# Patient Record
Sex: FEMALE | Race: WHITE | NOT HISPANIC OR LATINO
[De-identification: names, ages, dates, MRNs, and addresses within clinical notes are randomized per-mention and may not be internally consistent; named-entity substitution may affect disease eponyms.]

---

## 2018-02-27 PROBLEM — Z00.00 ENCOUNTER FOR PREVENTIVE HEALTH EXAMINATION: Status: ACTIVE | Noted: 2018-02-27

## 2018-03-06 ENCOUNTER — APPOINTMENT (OUTPATIENT)
Dept: OBGYN | Facility: CLINIC | Age: 23
End: 2018-03-06
Payer: COMMERCIAL

## 2018-03-06 PROCEDURE — 99214 OFFICE O/P EST MOD 30 MIN: CPT

## 2018-03-20 ENCOUNTER — APPOINTMENT (OUTPATIENT)
Dept: OBGYN | Facility: CLINIC | Age: 23
End: 2018-03-20
Payer: COMMERCIAL

## 2018-03-20 ENCOUNTER — TRANSCRIPTION ENCOUNTER (OUTPATIENT)
Age: 23
End: 2018-03-20

## 2018-03-20 PROCEDURE — 58300 INSERT INTRAUTERINE DEVICE: CPT

## 2018-04-17 ENCOUNTER — APPOINTMENT (OUTPATIENT)
Dept: OBGYN | Facility: CLINIC | Age: 23
End: 2018-04-17
Payer: COMMERCIAL

## 2018-04-17 PROCEDURE — 99213 OFFICE O/P EST LOW 20 MIN: CPT

## 2018-08-27 ENCOUNTER — APPOINTMENT (OUTPATIENT)
Dept: OBGYN | Facility: CLINIC | Age: 23
End: 2018-08-27
Payer: COMMERCIAL

## 2018-08-27 PROCEDURE — 99213 OFFICE O/P EST LOW 20 MIN: CPT

## 2018-09-26 ENCOUNTER — APPOINTMENT (OUTPATIENT)
Dept: OBGYN | Facility: CLINIC | Age: 23
End: 2018-09-26
Payer: COMMERCIAL

## 2018-09-26 PROCEDURE — 76830 TRANSVAGINAL US NON-OB: CPT

## 2019-03-12 ENCOUNTER — APPOINTMENT (OUTPATIENT)
Dept: OBGYN | Facility: CLINIC | Age: 24
End: 2019-03-12
Payer: COMMERCIAL

## 2019-03-12 PROCEDURE — 99212 OFFICE O/P EST SF 10 MIN: CPT

## 2019-03-19 ENCOUNTER — APPOINTMENT (OUTPATIENT)
Dept: OBGYN | Facility: CLINIC | Age: 24
End: 2019-03-19
Payer: COMMERCIAL

## 2019-03-19 PROCEDURE — 76818 FETAL BIOPHYS PROFILE W/NST: CPT

## 2019-03-19 PROCEDURE — 93975 VASCULAR STUDY: CPT

## 2019-03-26 ENCOUNTER — APPOINTMENT (OUTPATIENT)
Dept: OBGYN | Facility: CLINIC | Age: 24
End: 2019-03-26
Payer: COMMERCIAL

## 2019-03-26 PROCEDURE — 58301 REMOVE INTRAUTERINE DEVICE: CPT

## 2019-04-30 ENCOUNTER — APPOINTMENT (OUTPATIENT)
Dept: OBGYN | Facility: CLINIC | Age: 24
End: 2019-04-30

## 2019-05-28 ENCOUNTER — APPOINTMENT (OUTPATIENT)
Dept: OBGYN | Facility: CLINIC | Age: 24
End: 2019-05-28
Payer: COMMERCIAL

## 2019-05-28 PROCEDURE — 76830 TRANSVAGINAL US NON-OB: CPT

## 2020-05-14 ENCOUNTER — APPOINTMENT (OUTPATIENT)
Dept: OBGYN | Facility: CLINIC | Age: 25
End: 2020-05-14
Payer: COMMERCIAL

## 2020-05-14 PROCEDURE — 99395 PREV VISIT EST AGE 18-39: CPT

## 2020-05-14 PROCEDURE — 87490 CHLMYD TRACH DNA DIR PROBE: CPT

## 2020-06-08 ENCOUNTER — APPOINTMENT (OUTPATIENT)
Dept: OBGYN | Facility: CLINIC | Age: 25
End: 2020-06-08
Payer: COMMERCIAL

## 2020-06-08 PROCEDURE — 99212 OFFICE O/P EST SF 10 MIN: CPT

## 2020-06-30 ENCOUNTER — APPOINTMENT (OUTPATIENT)
Dept: OBGYN | Facility: CLINIC | Age: 25
End: 2020-06-30
Payer: COMMERCIAL

## 2020-06-30 PROCEDURE — 57454 BX/CURETT OF CERVIX W/SCOPE: CPT

## 2020-07-14 ENCOUNTER — APPOINTMENT (OUTPATIENT)
Dept: OBGYN | Facility: CLINIC | Age: 25
End: 2020-07-14
Payer: COMMERCIAL

## 2020-07-14 PROCEDURE — 99212 OFFICE O/P EST SF 10 MIN: CPT

## 2020-11-17 ENCOUNTER — APPOINTMENT (OUTPATIENT)
Dept: OBGYN | Facility: CLINIC | Age: 25
End: 2020-11-17
Payer: COMMERCIAL

## 2020-11-17 PROCEDURE — 99213 OFFICE O/P EST LOW 20 MIN: CPT

## 2020-11-17 PROCEDURE — 87490 CHLMYD TRACH DNA DIR PROBE: CPT

## 2021-05-18 ENCOUNTER — NON-APPOINTMENT (OUTPATIENT)
Age: 26
End: 2021-05-18

## 2021-06-02 ENCOUNTER — NON-APPOINTMENT (OUTPATIENT)
Age: 26
End: 2021-06-02

## 2021-06-02 ENCOUNTER — APPOINTMENT (OUTPATIENT)
Dept: OTOLARYNGOLOGY | Facility: CLINIC | Age: 26
End: 2021-06-02
Payer: COMMERCIAL

## 2021-06-02 VITALS — BODY MASS INDEX: 39.24 KG/M2 | HEIGHT: 67 IN | WEIGHT: 250 LBS

## 2021-06-02 DIAGNOSIS — Z78.9 OTHER SPECIFIED HEALTH STATUS: ICD-10-CM

## 2021-06-02 DIAGNOSIS — J02.9 ACUTE PHARYNGITIS, UNSPECIFIED: ICD-10-CM

## 2021-06-02 PROCEDURE — 99204 OFFICE O/P NEW MOD 45 MIN: CPT | Mod: 25

## 2021-06-02 PROCEDURE — 31575 DIAGNOSTIC LARYNGOSCOPY: CPT

## 2021-06-02 NOTE — HISTORY OF PRESENT ILLNESS
[de-identified] : Patient presents today with c/o sore throat. Constant sore throat. Recently finished Doxy which helped a bit. Patient states the otalgia started October 2020.Denies dysphagia. intermittent horseness. \par She states the throat pain starts first. She feels like she constantly has an ear infection but was checked multiple times and no infection.\par admits reflux daily, however not on any Meds. +coffee +eating late.

## 2021-06-02 NOTE — PROCEDURE
[True Vocal Cords Erythematous] : true vocal cord edema on the right [Glottis Arytenoid Cartilages Erythema] : bilateral arytenoid ~M erythema [Normal] : posterior cricoid area had healthy pink mucosa in the interarytenoid area and the esophageal inlet [Interarytenoid Edema] : interarytenoid area edematous

## 2021-06-04 ENCOUNTER — RX RENEWAL (OUTPATIENT)
Age: 26
End: 2021-06-04

## 2021-07-01 ENCOUNTER — APPOINTMENT (OUTPATIENT)
Dept: OTOLARYNGOLOGY | Facility: CLINIC | Age: 26
End: 2021-07-01
Payer: COMMERCIAL

## 2021-07-01 PROCEDURE — 31575 DIAGNOSTIC LARYNGOSCOPY: CPT

## 2021-07-01 PROCEDURE — 99213 OFFICE O/P EST LOW 20 MIN: CPT | Mod: 25

## 2021-07-01 NOTE — ASSESSMENT
[FreeTextEntry1] : Pt was counselled on a low acid acid and appropriate dietary modifications.\par \par Pt will wean off allergy meds and continue reflux for another 6 weeks. \par

## 2021-07-01 NOTE — HISTORY OF PRESENT ILLNESS
[FreeTextEntry1] : Patient presents today following up on throat pain, pharyngitis. Patient admits feeling much better. No throat pain. Patient would like to stop medications.

## 2021-08-26 ENCOUNTER — RX RENEWAL (OUTPATIENT)
Age: 26
End: 2021-08-26

## 2021-09-01 ENCOUNTER — APPOINTMENT (OUTPATIENT)
Dept: OTOLARYNGOLOGY | Facility: CLINIC | Age: 26
End: 2021-09-01
Payer: COMMERCIAL

## 2021-09-01 PROCEDURE — 31575 DIAGNOSTIC LARYNGOSCOPY: CPT

## 2021-09-01 PROCEDURE — 99213 OFFICE O/P EST LOW 20 MIN: CPT | Mod: 25

## 2021-09-01 NOTE — ASSESSMENT
[FreeTextEntry1] : Pt will wean off meds slowly over the next few weeks and pay attention to symptoms. \par Pt will continue dietary changes.

## 2021-09-01 NOTE — PROCEDURE
[None] : none [Flexible Endoscope] : examined with the flexible endoscope [True Vocal Cords Erythematous] : bilateral true vocal cord edema [Glottis Arytenoid Cartilages Erythema] : bilateral arytenoid ~M erythema [Normal] : posterior cricoid area had healthy pink mucosa in the interarytenoid area and the esophageal inlet

## 2021-09-01 NOTE — HISTORY OF PRESENT ILLNESS
[FreeTextEntry1] : PAtient returns today following up on throat pain , LPRD . No more throat discomfort.  Doing well since last visit has changed diet. Pt has stopped allergy meds. Pt has been compliant with meds.

## 2021-11-29 ENCOUNTER — TRANSCRIPTION ENCOUNTER (OUTPATIENT)
Age: 26
End: 2021-11-29

## 2021-11-29 RX ORDER — FAMOTIDINE 40 MG/1
40 TABLET, FILM COATED ORAL
Qty: 90 | Refills: 0 | Status: ACTIVE | COMMUNITY
Start: 2021-06-02 | End: 1900-01-01

## 2021-11-29 RX ORDER — LEVOCETIRIZINE DIHYDROCHLORIDE 5 MG/1
5 TABLET ORAL
Qty: 90 | Refills: 0 | Status: ACTIVE | COMMUNITY
Start: 2021-06-02 | End: 1900-01-01

## 2021-11-29 RX ORDER — PANTOPRAZOLE 40 MG/1
40 TABLET, DELAYED RELEASE ORAL
Qty: 90 | Refills: 0 | Status: ACTIVE | COMMUNITY
Start: 2021-06-02 | End: 1900-01-01

## 2022-01-13 ENCOUNTER — APPOINTMENT (OUTPATIENT)
Dept: OBGYN | Facility: CLINIC | Age: 27
End: 2022-01-13

## 2022-02-03 ENCOUNTER — APPOINTMENT (OUTPATIENT)
Dept: OBGYN | Facility: CLINIC | Age: 27
End: 2022-02-03
Payer: COMMERCIAL

## 2022-02-03 PROCEDURE — 99213 OFFICE O/P EST LOW 20 MIN: CPT

## 2022-03-09 ENCOUNTER — EMERGENCY (EMERGENCY)
Facility: HOSPITAL | Age: 27
LOS: 0 days | Discharge: HOME | End: 2022-03-09
Attending: EMERGENCY MEDICINE | Admitting: EMERGENCY MEDICINE
Payer: COMMERCIAL

## 2022-03-09 VITALS
HEART RATE: 94 BPM | DIASTOLIC BLOOD PRESSURE: 92 MMHG | OXYGEN SATURATION: 98 % | WEIGHT: 257.06 LBS | SYSTOLIC BLOOD PRESSURE: 140 MMHG | TEMPERATURE: 98 F | RESPIRATION RATE: 19 BRPM

## 2022-03-09 DIAGNOSIS — H92.03 OTALGIA, BILATERAL: ICD-10-CM

## 2022-03-09 DIAGNOSIS — R59.0 LOCALIZED ENLARGED LYMPH NODES: ICD-10-CM

## 2022-03-09 DIAGNOSIS — M35.9 SYSTEMIC INVOLVEMENT OF CONNECTIVE TISSUE, UNSPECIFIED: ICD-10-CM

## 2022-03-09 LAB
ANION GAP SERPL CALC-SCNC: 12 MMOL/L — SIGNIFICANT CHANGE UP (ref 7–14)
BASOPHILS # BLD AUTO: 0.02 K/UL — SIGNIFICANT CHANGE UP (ref 0–0.2)
BASOPHILS NFR BLD AUTO: 0.4 % — SIGNIFICANT CHANGE UP (ref 0–1)
BUN SERPL-MCNC: 10 MG/DL — SIGNIFICANT CHANGE UP (ref 10–20)
CALCIUM SERPL-MCNC: 10 MG/DL — SIGNIFICANT CHANGE UP (ref 8.5–10.1)
CHLORIDE SERPL-SCNC: 100 MMOL/L — SIGNIFICANT CHANGE UP (ref 98–110)
CO2 SERPL-SCNC: 25 MMOL/L — SIGNIFICANT CHANGE UP (ref 17–32)
CREAT SERPL-MCNC: 0.8 MG/DL — SIGNIFICANT CHANGE UP (ref 0.7–1.5)
EGFR: 105 ML/MIN/1.73M2 — SIGNIFICANT CHANGE UP
EOSINOPHIL # BLD AUTO: 0.06 K/UL — SIGNIFICANT CHANGE UP (ref 0–0.7)
EOSINOPHIL NFR BLD AUTO: 1.2 % — SIGNIFICANT CHANGE UP (ref 0–8)
GLUCOSE SERPL-MCNC: 94 MG/DL — SIGNIFICANT CHANGE UP (ref 70–99)
HCG SERPL-ACNC: <0.6 MIU/ML — SIGNIFICANT CHANGE UP
HCT VFR BLD CALC: 42.3 % — SIGNIFICANT CHANGE UP (ref 37–47)
HGB BLD-MCNC: 14.2 G/DL — SIGNIFICANT CHANGE UP (ref 12–16)
IMM GRANULOCYTES NFR BLD AUTO: 0.2 % — SIGNIFICANT CHANGE UP (ref 0.1–0.3)
LYMPHOCYTES # BLD AUTO: 0.89 K/UL — LOW (ref 1.2–3.4)
LYMPHOCYTES # BLD AUTO: 17.9 % — LOW (ref 20.5–51.1)
MCHC RBC-ENTMCNC: 27.7 PG — SIGNIFICANT CHANGE UP (ref 27–31)
MCHC RBC-ENTMCNC: 33.6 G/DL — SIGNIFICANT CHANGE UP (ref 32–37)
MCV RBC AUTO: 82.5 FL — SIGNIFICANT CHANGE UP (ref 81–99)
MONOCYTES # BLD AUTO: 0.51 K/UL — SIGNIFICANT CHANGE UP (ref 0.1–0.6)
MONOCYTES NFR BLD AUTO: 10.2 % — HIGH (ref 1.7–9.3)
NEUTROPHILS # BLD AUTO: 3.49 K/UL — SIGNIFICANT CHANGE UP (ref 1.4–6.5)
NEUTROPHILS NFR BLD AUTO: 70.1 % — SIGNIFICANT CHANGE UP (ref 42.2–75.2)
NRBC # BLD: 0 /100 WBCS — SIGNIFICANT CHANGE UP (ref 0–0)
PLATELET # BLD AUTO: 186 K/UL — SIGNIFICANT CHANGE UP (ref 130–400)
POTASSIUM SERPL-MCNC: 3.8 MMOL/L — SIGNIFICANT CHANGE UP (ref 3.5–5)
POTASSIUM SERPL-SCNC: 3.8 MMOL/L — SIGNIFICANT CHANGE UP (ref 3.5–5)
RBC # BLD: 5.13 M/UL — SIGNIFICANT CHANGE UP (ref 4.2–5.4)
RBC # FLD: 12.8 % — SIGNIFICANT CHANGE UP (ref 11.5–14.5)
SODIUM SERPL-SCNC: 137 MMOL/L — SIGNIFICANT CHANGE UP (ref 135–146)
WBC # BLD: 4.98 K/UL — SIGNIFICANT CHANGE UP (ref 4.8–10.8)
WBC # FLD AUTO: 4.98 K/UL — SIGNIFICANT CHANGE UP (ref 4.8–10.8)

## 2022-03-09 PROCEDURE — 70481 CT ORBIT/EAR/FOSSA W/DYE: CPT | Mod: 26,MA

## 2022-03-09 PROCEDURE — 99285 EMERGENCY DEPT VISIT HI MDM: CPT

## 2022-03-09 RX ORDER — OMEPRAZOLE 10 MG/1
0 CAPSULE, DELAYED RELEASE ORAL
Qty: 0 | Refills: 0 | DISCHARGE

## 2022-03-09 RX ORDER — KETOROLAC TROMETHAMINE 30 MG/ML
15 SYRINGE (ML) INJECTION ONCE
Refills: 0 | Status: DISCONTINUED | OUTPATIENT
Start: 2022-03-09 | End: 2022-03-09

## 2022-03-09 RX ORDER — FAMOTIDINE 10 MG/ML
0 INJECTION INTRAVENOUS
Qty: 0 | Refills: 0 | DISCHARGE

## 2022-03-09 RX ADMIN — Medication 15 MILLIGRAM(S): at 14:49

## 2022-03-09 NOTE — ED PROVIDER NOTE - NSFOLLOWUPINSTRUCTIONS_ED_ALL_ED_FT
You have Neck adnopathy (swelling of lymphnodes) continue taking NSAIDs (like IBUPROFEN/Motrin 600 mg every 6 hr with food)      Medical Clearance    A medical screening exam has been done. This exam helps find the cause of your problem and determines whether you need emergency treatment. Your exam has shown that you do not need emergency treatment at this point. It is safe for you to go to your caregiver's office or clinic for treatment. You should make an appointment today to see your caregiver as soon as he or she is available.

## 2022-03-09 NOTE — ED ADULT NURSE NOTE - NSIMPLEMENTINTERV_GEN_ALL_ED
Implemented All Universal Safety Interventions:  [place] to call system. Call bell, personal items and telephone within reach. Instruct patient to call for assistance. Room bathroom lighting operational. Non-slip footwear when patient is off stretcher. Physically safe environment: no spills, clutter or unnecessary equipment. Stretcher in lowest position, wheels locked, appropriate side rails in place.

## 2022-03-09 NOTE — ED PROVIDER NOTE - PHYSICAL EXAMINATION
Physical Exam    Vital Signs: I have reviewed the initial vital signs.  Constitutional: well-nourished, appears stated age, no acute distress  Eyes: PERRLA, EOM intact, RAPD absent, conjuctiva clear and symmetrical lids.  ENT: +posterior adenopathy, normal TM, no erythema, no exudates, no swelling under the tongue  Cardiovascular: +S1/S2, no murmurs, regular rate, regular rhythm, well-perfused extremities  Respiratory: unlabored respiratory effort, clear to auscultation bilaterally, speaks in full sentences

## 2022-03-09 NOTE — ED PROVIDER NOTE - CLINICAL SUMMARY MEDICAL DECISION MAKING FREE TEXT BOX
26yoF with hx of autoimmune disease on intermittent rituximab here with Pain behind bilateral ears for the past couple of days.  Some swelling and redness but no fever or ear pain.  No headache or neck stiffness.  Denies any other symptoms.  Told to come to rule out mastoiditis.  On exam nontoxic, vital signs stable, some significantly clustered posterior auricular lymphadenopathy with redness and tenderness.  More on the left than the right with minimal fluctuance on the left.  Not specifically over the mastoid bilaterally but in that area.  TMs clear bilaterally with no effusion.  No meningismus, nontoxic otherwise.  Labs reassuring, CTs with no signs of mastoiditis. In my opinion, based on current evaluation and results, an acute medical or surgical emergency does not appear to be occurring at this time and I feel that the pt is stable for further outpatient work up and/or treatment.

## 2022-03-09 NOTE — ED PROVIDER NOTE - PATIENT PORTAL LINK FT
You can access the FollowMyHealth Patient Portal offered by [facility] by registering at the following website: [email or web address]. By joining [employer]’s FollowMyHealth portal, you will also be able to view your health information using other applications (apps) compatible with our system.

## 2022-03-09 NOTE — ED PROVIDER NOTE - OBJECTIVE STATEMENT
27 yo f no sig pmhx pw pain aching behind the ear b/l with post adenopathy gradual onset over the course of 1.5 d with no proceeding ear ache or discharge. No fevers or chills. Pt was seen by PMD told to come for ct of temporal bones to r/o infection.    I have reviewed available current nursing and previous documentation of past medical, surgical, family, and/or social history.

## 2022-08-08 ENCOUNTER — APPOINTMENT (OUTPATIENT)
Dept: OTOLARYNGOLOGY | Facility: CLINIC | Age: 27
End: 2022-08-08

## 2022-08-08 DIAGNOSIS — R59.0 LOCALIZED ENLARGED LYMPH NODES: ICD-10-CM

## 2022-08-08 PROCEDURE — 31575 DIAGNOSTIC LARYNGOSCOPY: CPT

## 2022-08-08 PROCEDURE — 99214 OFFICE O/P EST MOD 30 MIN: CPT | Mod: 25

## 2022-08-08 NOTE — PROCEDURE
[Normal] : posterior cricoid area had healthy pink mucosa in the interarytenoid area and the esophageal inlet [True Vocal Cords Erythematous] : bilateral true vocal cord edema [Glottis Arytenoid Cartilages Erythema] : bilateral arytenoid ~M erythema

## 2022-08-08 NOTE — HISTORY OF PRESENT ILLNESS
[FreeTextEntry1] : \par Patient returns today following up on swollen lymph node , tonsil stones . Saw a tonsil stone, was able to take it out . Has been getting frequently sick . History of RA , was seen in ED for RA  flare up and  b/l swollen lymph node back in March 2022 . Lymph nodes were hard and swollen . Believes  stress  may have  triggered it.  CT performed in ED to rule out mastoiditis .   Some discomfort  when  swallowing on left  side , where tonsil stone was at .  Pt still on relfux meds.

## 2022-08-08 NOTE — REASON FOR VISIT
[Subsequent Evaluation] : a subsequent evaluation for [FreeTextEntry2] : swollen lymph node , tonsil stones

## 2022-08-08 NOTE — PHYSICAL EXAM
[Midline] : trachea located in midline position [Normal] : no rashes [de-identified] : no tonsilliths

## 2022-10-03 ENCOUNTER — RESULT REVIEW (OUTPATIENT)
Age: 27
End: 2022-10-03

## 2022-10-03 ENCOUNTER — OUTPATIENT (OUTPATIENT)
Dept: OUTPATIENT SERVICES | Facility: HOSPITAL | Age: 27
LOS: 1 days | Discharge: HOME | End: 2022-10-03

## 2022-10-03 DIAGNOSIS — R59.0 LOCALIZED ENLARGED LYMPH NODES: ICD-10-CM

## 2022-10-03 PROCEDURE — 76536 US EXAM OF HEAD AND NECK: CPT | Mod: 26

## 2023-08-24 ENCOUNTER — RX RENEWAL (OUTPATIENT)
Age: 28
End: 2023-08-24

## 2023-12-02 ENCOUNTER — NON-APPOINTMENT (OUTPATIENT)
Age: 28
End: 2023-12-02

## 2023-12-03 RX ORDER — FAMOTIDINE 40 MG/1
40 TABLET, FILM COATED ORAL
Qty: 90 | Refills: 0 | Status: ACTIVE | COMMUNITY
Start: 2023-12-03 | End: 1900-01-01

## 2024-05-16 ENCOUNTER — APPOINTMENT (OUTPATIENT)
Dept: ENDOCRINOLOGY | Facility: CLINIC | Age: 29
End: 2024-05-16
Payer: COMMERCIAL

## 2024-05-16 VITALS
WEIGHT: 293 LBS | BODY MASS INDEX: 45.99 KG/M2 | HEIGHT: 67 IN | SYSTOLIC BLOOD PRESSURE: 120 MMHG | DIASTOLIC BLOOD PRESSURE: 80 MMHG | OXYGEN SATURATION: 98 %

## 2024-05-16 VITALS — HEART RATE: 86 BPM | OXYGEN SATURATION: 98 % | BODY MASS INDEX: 45.99 KG/M2 | WEIGHT: 293 LBS | HEIGHT: 67 IN

## 2024-05-16 PROCEDURE — 99204 OFFICE O/P NEW MOD 45 MIN: CPT

## 2024-05-16 RX ORDER — DEXAMETHASONE 1 MG/1
1 TABLET ORAL
Qty: 1 | Refills: 0 | Status: ACTIVE | COMMUNITY
Start: 2024-05-16 | End: 1900-01-01

## 2024-05-16 NOTE — ASSESSMENT
[FreeTextEntry1] : 27 y/o F w/ no significant PMHx is here for initial evaluation of insulin resistance / BMI 46   #Class III Obesity #Insulin Resistance - BMI 46.67 this visit - Had IUD, taken off, then OCP for contraception, did not feel well on it - Off OCP since 5/2023, periods regular off OCPs - + Hirsutism, denies acne - Reports weight gain (easy to gain, difficult to lose weight) - TFTs WNL, TPO ab -ve (4/2024) - Total testosterone WNL (11/2023) - Insulin level elevated (11/2023), however glucose normal - Given some abdominal striae , will r/o Cushing  Plan: - Will do dexamethasone suppression test to r/o Cushing   - Advised lifestyle modification (low carb diet + exercise) - Discussed possibly starting metformin 500 mg BID however patient hesitant, will further discuss after results of dexamethasone suppression test   #?RA - Diagnosed at age of 20  - Was on Sulfasalazine for few years, then stopped it 3 weeks ago as advised by new rheumatologist as ANA CRISTINA, RF and CCP Ab -ve - Never on steroids for RA

## 2024-05-16 NOTE — REVIEW OF SYSTEMS
[Recent Weight Gain (___ Lbs)] : recent weight gain: [unfilled] lbs [Muscle Weakness] : muscle weakness [Hirsutism] : hirsutism [Fatigue] : no fatigue [Decreased Appetite] : appetite not decreased [Recent Weight Loss (___ Lbs)] : no recent weight loss [Dry Eyes] : no dryness [Dysphagia] : no dysphagia [Dysphonia] : no dysphonia [Chest Pain] : no chest pain [Palpitations] : no palpitations [Shortness Of Breath] : no shortness of breath [Nausea] : no nausea [Constipation] : no constipation [Vomiting] : no vomiting [Irregular Menses] : regular menses [Joint Pain] : no joint pain [Acanthosis] : no acanthosis  [Acne] : no acne

## 2024-05-16 NOTE — HISTORY OF PRESENT ILLNESS
[FreeTextEntry1] : 29 y/o F w/ no significant PMHx is here for initial evaluation of insulin resistance , weight gain   #Class III Obesity #Insulin Resistance - Had IUD, taken off, then OCP for contraception, did not feel well on it - Off OCP since 5/2023, periods regular off OCPs - + Hirsutism, denies acne - Reports weight gain (easy to gain, difficult to lose weight) - TFTs WNL - Insulin level elevated, however glucose normal  #?RA - Diagnosed at age of 20  - Was on Sulfasalazine for few years, then stopped it 3 weeks ago as advised by new rheumatologist as ANA CRISTINA, RF and CCP Ab -ve - Never on steroids for RA

## 2024-05-16 NOTE — DATA REVIEWED
[FreeTextEntry1] : 11/2023:    glucose 80  A1c 5.1% AST 22  ALT 39 crea 0.68  glucose 80 TSH 2.1  Total test 34  b12 296 insulin 24   25 vit 22   2/2024: glucose 97    4/2024: glucose 92 TSH 1.62  Ft4 1.1TPO and tg antibodies negative

## 2024-05-16 NOTE — PHYSICAL EXAM
[Alert] : alert [No Acute Distress] : no acute distress [Normal Sclera/Conjunctiva] : normal sclera/conjunctiva [Normal Outer Ear/Nose] : the ears and nose were normal in appearance [No Neck Mass] : no neck mass was observed [No LAD] : no lymphadenopathy [Supple] : the neck was supple [Thyroid Not Enlarged] : the thyroid was not enlarged [No Thyroid Nodules] : no palpable thyroid nodules [No Respiratory Distress] : no respiratory distress [No Accessory Muscle Use] : no accessory muscle use [Normal Rate and Effort] : normal respiratory rate and effort [Clear to Auscultation] : lungs were clear to auscultation bilaterally [Normal Rate] : heart rate was normal [Regular Rhythm] : with a regular rhythm [Not Tender] : non-tender [Soft] : abdomen soft [No HSM] : no hepato-splenomegaly [Obese] : obese [de-identified] : + Abd striae + Moon Face

## 2024-05-29 LAB
ACTH SER-ACNC: 1.6 PG/ML
CORTIS SERPL-MCNC: 0.4 UG/DL

## 2024-05-31 LAB — DEXAMETHASONE LEVEL: 326 NG/DL

## 2024-06-05 ENCOUNTER — RX RENEWAL (OUTPATIENT)
Age: 29
End: 2024-06-05

## 2024-06-05 ENCOUNTER — APPOINTMENT (OUTPATIENT)
Dept: OTOLARYNGOLOGY | Facility: CLINIC | Age: 29
End: 2024-06-05
Payer: COMMERCIAL

## 2024-06-05 VITALS — WEIGHT: 293 LBS | BODY MASS INDEX: 45.99 KG/M2 | HEIGHT: 67 IN

## 2024-06-05 DIAGNOSIS — J35.8 OTHER CHRONIC DISEASES OF TONSILS AND ADENOIDS: ICD-10-CM

## 2024-06-05 DIAGNOSIS — K21.9 GASTRO-ESOPHAGEAL REFLUX DISEASE W/OUT ESOPHAGITIS: ICD-10-CM

## 2024-06-05 DIAGNOSIS — R07.0 PAIN IN THROAT: ICD-10-CM

## 2024-06-05 DIAGNOSIS — J31.2 CHRONIC PHARYNGITIS: ICD-10-CM

## 2024-06-05 PROCEDURE — 31575 DIAGNOSTIC LARYNGOSCOPY: CPT

## 2024-06-05 PROCEDURE — 99214 OFFICE O/P EST MOD 30 MIN: CPT | Mod: 25

## 2024-06-05 RX ORDER — FAMOTIDINE 40 MG/1
40 TABLET, FILM COATED ORAL
Qty: 90 | Refills: 0 | Status: ACTIVE | COMMUNITY
Start: 2024-06-05 | End: 1900-01-01

## 2024-06-05 RX ORDER — CLINDAMYCIN HYDROCHLORIDE 300 MG/1
300 CAPSULE ORAL EVERY 6 HOURS
Qty: 40 | Refills: 2 | Status: ACTIVE | COMMUNITY
Start: 2024-06-05 | End: 1900-01-01

## 2024-06-05 RX ORDER — PANTOPRAZOLE 40 MG/1
40 TABLET, DELAYED RELEASE ORAL
Qty: 90 | Refills: 0 | Status: ACTIVE | COMMUNITY
Start: 2024-06-05 | End: 1900-01-01

## 2024-06-05 NOTE — PHYSICAL EXAM
[Normal] : mucosa is normal [Midline] : trachea located in midline position [de-identified] : tonsilith

## 2024-06-05 NOTE — HISTORY OF PRESENT ILLNESS
[FreeTextEntry1] : Patient following up today on tonsil stones , swollen left lymph node .   Patient states she has some tonsil stones that seem to be embedded in her left tonsil for 2 years. Feels like there's another one in R side. Tried salt water gargles and tonsil tools. Her LPRD has been improving with the Famotidine. Has constant throat discomfort.

## 2024-06-19 ENCOUNTER — APPOINTMENT (OUTPATIENT)
Dept: ENDOCRINOLOGY | Facility: CLINIC | Age: 29
End: 2024-06-19
Payer: COMMERCIAL

## 2024-06-19 DIAGNOSIS — E88.819 INSULIN RESISTANCE, UNSPECIFIED: ICD-10-CM

## 2024-06-19 DIAGNOSIS — E66.01 MORBID (SEVERE) OBESITY DUE TO EXCESS CALORIES: ICD-10-CM

## 2024-06-19 DIAGNOSIS — R63.5 ABNORMAL WEIGHT GAIN: ICD-10-CM

## 2024-06-19 PROCEDURE — 99441: CPT

## 2024-06-19 RX ORDER — METFORMIN HYDROCHLORIDE 500 MG/1
500 TABLET, COATED ORAL
Qty: 180 | Refills: 1 | Status: ACTIVE | COMMUNITY
Start: 2024-06-19 | End: 1900-01-01

## 2024-06-19 NOTE — REASON FOR VISIT
[Follow - Up] : a follow-up visit [Weight Management/Obesity] : weight management/obesity [Home] : at home, [unfilled] , at the time of the visit. [Medical Office: ([facility])___] : at the medical office located in  [Verbal consent obtained from patient] : the patient, [unfilled]

## 2024-06-19 NOTE — PHYSICAL EXAM
[Alert] : alert [Obese] : obese [No Acute Distress] : no acute distress [Thyroid Not Enlarged] : the thyroid was not enlarged [No Thyroid Nodules] : no palpable thyroid nodules [No Respiratory Distress] : no respiratory distress [No Accessory Muscle Use] : no accessory muscle use [Normal Rate and Effort] : normal respiratory rate and effort [Clear to Auscultation] : lungs were clear to auscultation bilaterally [Normal Rate] : heart rate was normal [Regular Rhythm] : with a regular rhythm [Not Tender] : non-tender [Soft] : abdomen soft [No HSM] : no hepato-splenomegaly [de-identified] : + Abd striae + Moon Face

## 2024-06-19 NOTE — HISTORY OF PRESENT ILLNESS
[FreeTextEntry1] : 28 y/o F w/ no significant PMHx is here for follow up  evaluation of insulin resistance , weight gain   #Class III Obesity #Insulin Resistance - Had IUD, taken off, then OCP for contraception, did not feel well on it - Off OCP since 5/2023, periods regular off OCPs - + Hirsutism, denies acne - Reports weight gain (easy to gain, difficult to lose weight) - TFTs WNL - Insulin level elevated, however glucose normal 6/2024: - dex suppression test with normal response

## 2024-06-19 NOTE — DATA REVIEWED
[FreeTextEntry1] : 11/2023:    glucose 80  A1c 5.1% AST 22  ALT 39 crea 0.68  glucose 80 TSH 2.1  Total test 34  b12 296 insulin 24   25 vit 22   2/2024: glucose 97    4/2024: glucose 92 TSH 1.62  Ft4 1.1TPO and tg antibodies negative  5/2024: dex suppression test : am cortisol 0.4 ACTh 1.6 dex level 326 ( adequate )

## 2024-07-17 ENCOUNTER — APPOINTMENT (OUTPATIENT)
Dept: OTOLARYNGOLOGY | Facility: CLINIC | Age: 29
End: 2024-07-17
Payer: COMMERCIAL

## 2024-07-17 ENCOUNTER — EMERGENCY (EMERGENCY)
Facility: HOSPITAL | Age: 29
LOS: 0 days | Discharge: HOME | End: 2024-07-17
Attending: EMERGENCY MEDICINE
Payer: COMMERCIAL

## 2024-07-17 VITALS
RESPIRATION RATE: 18 BRPM | OXYGEN SATURATION: 97 % | HEART RATE: 82 BPM | WEIGHT: 293 LBS | DIASTOLIC BLOOD PRESSURE: 100 MMHG | SYSTOLIC BLOOD PRESSURE: 137 MMHG | TEMPERATURE: 98 F

## 2024-07-17 DIAGNOSIS — R51.9 HEADACHE, UNSPECIFIED: ICD-10-CM

## 2024-07-17 DIAGNOSIS — J31.2 CHRONIC PHARYNGITIS: ICD-10-CM

## 2024-07-17 DIAGNOSIS — R22.0 LOCALIZED SWELLING, MASS AND LUMP, HEAD: ICD-10-CM

## 2024-07-17 DIAGNOSIS — K21.9 GASTRO-ESOPHAGEAL REFLUX DISEASE W/OUT ESOPHAGITIS: ICD-10-CM

## 2024-07-17 DIAGNOSIS — J35.8 OTHER CHRONIC DISEASES OF TONSILS AND ADENOIDS: ICD-10-CM

## 2024-07-17 DIAGNOSIS — M06.9 RHEUMATOID ARTHRITIS, UNSPECIFIED: ICD-10-CM

## 2024-07-17 DIAGNOSIS — R07.0 PAIN IN THROAT: ICD-10-CM

## 2024-07-17 PROCEDURE — 31575 DIAGNOSTIC LARYNGOSCOPY: CPT

## 2024-07-17 PROCEDURE — 99284 EMERGENCY DEPT VISIT MOD MDM: CPT

## 2024-07-17 PROCEDURE — 99283 EMERGENCY DEPT VISIT LOW MDM: CPT

## 2024-07-17 PROCEDURE — 99214 OFFICE O/P EST MOD 30 MIN: CPT | Mod: 25

## 2024-07-17 RX ORDER — PREDNISONE 20 MG/1
20 TABLET ORAL
Qty: 14 | Refills: 0 | Status: ACTIVE | COMMUNITY
Start: 2024-07-17 | End: 1900-01-01

## 2024-07-17 RX ORDER — DEXAMETHASONE 1 MG/1
10 TABLET ORAL ONCE
Refills: 0 | Status: COMPLETED | OUTPATIENT
Start: 2024-07-17 | End: 2024-07-17

## 2024-07-17 RX ADMIN — DEXAMETHASONE 10 MILLIGRAM(S): 1 TABLET ORAL at 00:42

## 2024-07-30 ENCOUNTER — NON-APPOINTMENT (OUTPATIENT)
Age: 29
End: 2024-07-30

## 2024-10-14 NOTE — ASU PATIENT PROFILE, ADULT - NSICDXPASTMEDICALHX_GEN_ALL_CORE_FT
PAST MEDICAL HISTORY:  Asthma     H/O chronic pharyngitis     Insulin resistance     Left facial swelling     LPRD (laryngopharyngeal reflux disease)     NAFLD (nonalcoholic fatty liver disease)     Obesity     PCOS (polycystic ovarian syndrome)     Rheumatoid arthritis     Throat pain     Tonsil stone

## 2024-10-14 NOTE — ASU PATIENT PROFILE, ADULT - PRO INTERPRETER NEED 2
English

## 2024-10-15 ENCOUNTER — OUTPATIENT (OUTPATIENT)
Dept: OUTPATIENT SERVICES | Facility: HOSPITAL | Age: 29
LOS: 1 days | Discharge: HOME | End: 2024-10-15
Payer: COMMERCIAL

## 2024-10-15 ENCOUNTER — APPOINTMENT (OUTPATIENT)
Dept: OTOLARYNGOLOGY | Facility: AMBULATORY SURGERY CENTER | Age: 29
End: 2024-10-15

## 2024-10-15 VITALS
RESPIRATION RATE: 18 BRPM | OXYGEN SATURATION: 99 % | SYSTOLIC BLOOD PRESSURE: 130 MMHG | DIASTOLIC BLOOD PRESSURE: 72 MMHG | HEART RATE: 72 BPM

## 2024-10-15 VITALS
OXYGEN SATURATION: 98 % | HEART RATE: 80 BPM | TEMPERATURE: 98 F | SYSTOLIC BLOOD PRESSURE: 125 MMHG | HEIGHT: 67 IN | RESPIRATION RATE: 20 BRPM | WEIGHT: 283.29 LBS | DIASTOLIC BLOOD PRESSURE: 82 MMHG

## 2024-10-15 DIAGNOSIS — J31.2 CHRONIC PHARYNGITIS: ICD-10-CM

## 2024-10-15 DIAGNOSIS — K08.409 PARTIAL LOSS OF TEETH, UNSPECIFIED CAUSE, UNSPECIFIED CLASS: Chronic | ICD-10-CM

## 2024-10-15 DIAGNOSIS — J35.8 OTHER CHRONIC DISEASES OF TONSILS AND ADENOIDS: ICD-10-CM

## 2024-10-15 PROCEDURE — 42826 REMOVAL OF TONSILS: CPT

## 2024-10-15 PROCEDURE — 88304 TISSUE EXAM BY PATHOLOGIST: CPT

## 2024-10-15 PROCEDURE — C9399: CPT

## 2024-10-15 PROCEDURE — 88304 TISSUE EXAM BY PATHOLOGIST: CPT | Mod: 26

## 2024-10-15 RX ORDER — HYDROMORPHONE HYDROCHLORIDE 1 MG/ML
0.5 INJECTION, SOLUTION INTRAMUSCULAR; INTRAVENOUS; SUBCUTANEOUS
Refills: 0 | Status: DISCONTINUED | OUTPATIENT
Start: 2024-10-15 | End: 2024-10-15

## 2024-10-15 RX ORDER — AMOXICILLIN 250 MG/5ML
10 SUSPENSION, RECONSTITUTED, ORAL (ML) ORAL
Qty: 1 | Refills: 0
Start: 2024-10-15 | End: 2024-10-21

## 2024-10-15 RX ORDER — ONDANSETRON HCL/PF 4 MG/2 ML
4 VIAL (ML) INJECTION ONCE
Refills: 0 | Status: COMPLETED | OUTPATIENT
Start: 2024-10-15 | End: 2024-10-15

## 2024-10-15 RX ORDER — OXYCODONE AND ACETAMINOPHEN 5; 325 MG/1; MG/1
1 TABLET ORAL
Qty: 12 | Refills: 0
Start: 2024-10-15 | End: 2024-10-17

## 2024-10-15 RX ADMIN — Medication 4 MILLIGRAM(S): at 09:58

## 2024-10-15 RX ADMIN — HYDROMORPHONE HYDROCHLORIDE 0.5 MILLIGRAM(S): 1 INJECTION, SOLUTION INTRAMUSCULAR; INTRAVENOUS; SUBCUTANEOUS at 10:13

## 2024-10-15 RX ADMIN — HYDROMORPHONE HYDROCHLORIDE 0.5 MILLIGRAM(S): 1 INJECTION, SOLUTION INTRAMUSCULAR; INTRAVENOUS; SUBCUTANEOUS at 10:25

## 2024-10-15 NOTE — ASU DISCHARGE PLAN (ADULT/PEDIATRIC) - NS MD DC FALL RISK RISK
For information on Fall & Injury Prevention, visit: [email or web address] OR  [email or web address] OR  https://www.cdc.gov/steadi/patient.html

## 2024-10-15 NOTE — CHART NOTE - NSCHARTNOTEFT_GEN_A_CORE
PACU ANESTHESIA ADMISSION NOTE      Procedure: T&A  Post op diagnosis: EVAN     ____  Intubated  TV:______       Rate: ______      FiO2: ______    __x__  Patent Airway    __x__  Full return of protective reflexes    __x__  Full recovery from anesthesia / back to baseline status    Vitals:  T(C): 36.4 (10-15-24 @ 09:35), Max: 36.7 (10-15-24 @ 08:04)  HR: 65 (10-15-24 @ 09:45) (65 - 80)  BP: 125/62 (10-15-24 @ 09:45) (125/62 - 129/63)  RR: 18 (10-15-24 @ 09:45) (18 - 20)  SpO2: 97% (10-15-24 @ 09:45) (94% - 98%)    Mental Status:  __x__ Awake   ___x__ Alert   _____ Drowsy   _____ Sedated    Nausea/Vomiting:  __x__ NO  ______Yes,   See Post - Op Orders          Pain Scale (0-10):  _____    Treatment: ____ None    __x__ See Post - Op/PCA Orders    Post - Operative Fluids:   ____ Oral   __x__ See Post - Op Orders    Plan: Discharge:   _x___Home       _____Floor     _____Critical Care    _____  Other:_________________    Comments: Patient had smooth intraoperative event, no anesthesia complication.

## 2024-10-15 NOTE — ASU DISCHARGE PLAN (ADULT/PEDIATRIC) - ASU DC SPECIAL INSTRUCTIONSFT
You are being discharged from [facility].    -Diet: Advance from liquids to a soft diet as tolerated, continue soft diet x 2 weeks.  -Medications: You may resume your home medications and please take over the counter tylenol for pain every 6 hours. Prescriptions have been sent to your pharmacy for: Amoxil 250/5mL(antibiotic liquid) - take 10mL every 12 hours x 1 week; Percocet 5/325mg 1 tablet every 6 hours as needed for severe breakthrough pain. Please do not take this medication while driving or operating heavy machinery as it may make you drowsy. Please take your antibiotic with food as it may cause stomach upset.  -Follow-up: Please call the office to schedule a follow-up appointment with Dr. Unger in 2 weeks, or follow-up as previously scheduled or directed.   -Please call the office with persistent fever greater than 101F, chest pain, shortness of breath, uncontrollable nausea/vomiting/pain, inability to tolerate oral intake, bleeding  from your nose/mouth greater than 2 teaspoons. If you have any further questions about your care, please do not hesitate to contact Dr. Unger's office.

## 2024-10-15 NOTE — ASU DISCHARGE PLAN (ADULT/PEDIATRIC) - CARE PROVIDER_API CALL
Jose David Unger  Otolaryngology  [address] [place] [id number]  Phone: [phone]  Fax: [phone]  Follow Up Time: 2 weeks

## 2024-10-17 DIAGNOSIS — K59.00 CONSTIPATION, UNSPECIFIED: ICD-10-CM

## 2024-10-17 PROBLEM — J45.909 UNSPECIFIED ASTHMA, UNCOMPLICATED: Chronic | Status: ACTIVE | Noted: 2024-10-02

## 2024-10-17 PROBLEM — K21.9 GASTRO-ESOPHAGEAL REFLUX DISEASE WITHOUT ESOPHAGITIS: Chronic | Status: ACTIVE | Noted: 2024-10-02

## 2024-10-17 PROBLEM — R07.0 PAIN IN THROAT: Chronic | Status: ACTIVE | Noted: 2024-10-02

## 2024-10-17 PROBLEM — K76.0 FATTY (CHANGE OF) LIVER, NOT ELSEWHERE CLASSIFIED: Chronic | Status: ACTIVE | Noted: 2024-10-02

## 2024-10-17 PROBLEM — E66.9 OBESITY, UNSPECIFIED: Chronic | Status: ACTIVE | Noted: 2024-10-02

## 2024-10-17 PROBLEM — R22.0 LOCALIZED SWELLING, MASS AND LUMP, HEAD: Chronic | Status: ACTIVE | Noted: 2024-10-02

## 2024-10-17 PROBLEM — M06.9 RHEUMATOID ARTHRITIS, UNSPECIFIED: Chronic | Status: ACTIVE | Noted: 2024-10-02

## 2024-10-17 PROBLEM — E88.819 INSULIN RESISTANCE, UNSPECIFIED: Chronic | Status: ACTIVE | Noted: 2024-10-02

## 2024-10-17 PROBLEM — J35.8 OTHER CHRONIC DISEASES OF TONSILS AND ADENOIDS: Chronic | Status: ACTIVE | Noted: 2024-10-02

## 2024-10-17 RX ORDER — OXYCODONE AND ACETAMINOPHEN 5; 325 MG/1; MG/1
5-325 TABLET ORAL
Qty: 8 | Refills: 0 | Status: ACTIVE | COMMUNITY
Start: 2024-10-17 | End: 1900-01-01

## 2024-10-17 RX ORDER — GLYCERIN 2 G/1
2 SUPPOSITORY RECTAL
Qty: 5 | Refills: 1 | Status: ACTIVE | COMMUNITY
Start: 2024-10-17 | End: 1900-01-01

## 2024-10-17 RX ORDER — NALOXONE HYDROCHLORIDE 4 MG/.1ML
4 SPRAY NASAL
Qty: 2 | Refills: 0 | Status: ACTIVE | COMMUNITY
Start: 2024-10-17 | End: 1900-01-01

## 2024-10-20 DIAGNOSIS — J35.01 CHRONIC TONSILLITIS: ICD-10-CM

## 2024-10-20 DIAGNOSIS — J45.909 UNSPECIFIED ASTHMA, UNCOMPLICATED: ICD-10-CM

## 2024-10-20 DIAGNOSIS — K21.9 GASTRO-ESOPHAGEAL REFLUX DISEASE WITHOUT ESOPHAGITIS: ICD-10-CM

## 2024-10-20 DIAGNOSIS — G47.33 OBSTRUCTIVE SLEEP APNEA (ADULT) (PEDIATRIC): ICD-10-CM

## 2024-10-20 DIAGNOSIS — Z79.84 LONG TERM (CURRENT) USE OF ORAL HYPOGLYCEMIC DRUGS: ICD-10-CM

## 2024-10-20 DIAGNOSIS — E66.9 OBESITY, UNSPECIFIED: ICD-10-CM

## 2024-10-20 DIAGNOSIS — M06.9 RHEUMATOID ARTHRITIS, UNSPECIFIED: ICD-10-CM

## 2024-10-28 LAB — SURGICAL PATHOLOGY STUDY: SIGNIFICANT CHANGE UP

## 2024-10-30 ENCOUNTER — APPOINTMENT (OUTPATIENT)
Dept: OTOLARYNGOLOGY | Facility: CLINIC | Age: 29
End: 2024-10-30
Payer: COMMERCIAL

## 2024-10-30 DIAGNOSIS — Z90.89 ACQUIRED ABSENCE OF OTHER ORGANS: ICD-10-CM

## 2024-10-30 PROCEDURE — 99024 POSTOP FOLLOW-UP VISIT: CPT

## 2024-10-31 ENCOUNTER — RX RENEWAL (OUTPATIENT)
Age: 29
End: 2024-10-31

## 2024-12-17 ENCOUNTER — APPOINTMENT (OUTPATIENT)
Dept: ENDOCRINOLOGY | Facility: CLINIC | Age: 29
End: 2024-12-17
Payer: COMMERCIAL

## 2024-12-17 VITALS
DIASTOLIC BLOOD PRESSURE: 70 MMHG | BODY MASS INDEX: 42.06 KG/M2 | HEIGHT: 67 IN | WEIGHT: 268 LBS | HEART RATE: 86 BPM | OXYGEN SATURATION: 99 % | TEMPERATURE: 97.6 F | SYSTOLIC BLOOD PRESSURE: 116 MMHG

## 2024-12-17 DIAGNOSIS — E66.01 MORBID (SEVERE) OBESITY DUE TO EXCESS CALORIES: ICD-10-CM

## 2024-12-17 DIAGNOSIS — E88.819 INSULIN RESISTANCE, UNSPECIFIED: ICD-10-CM

## 2024-12-17 PROCEDURE — 99213 OFFICE O/P EST LOW 20 MIN: CPT

## 2024-12-17 RX ORDER — BUPROPION HYDROCHLORIDE 150 MG/1
150 TABLET, EXTENDED RELEASE ORAL
Refills: 0 | Status: ACTIVE | COMMUNITY

## 2025-01-12 ENCOUNTER — EMERGENCY (EMERGENCY)
Facility: HOSPITAL | Age: 30
LOS: 0 days | Discharge: HOME | End: 2025-01-12
Attending: EMERGENCY MEDICINE
Payer: COMMERCIAL

## 2025-01-12 VITALS
HEART RATE: 110 BPM | DIASTOLIC BLOOD PRESSURE: 93 MMHG | TEMPERATURE: 98 F | OXYGEN SATURATION: 98 % | RESPIRATION RATE: 18 BRPM | WEIGHT: 268.08 LBS | SYSTOLIC BLOOD PRESSURE: 145 MMHG | HEIGHT: 67 IN

## 2025-01-12 DIAGNOSIS — N75.1 ABSCESS OF BARTHOLIN'S GLAND: ICD-10-CM

## 2025-01-12 DIAGNOSIS — K08.409 PARTIAL LOSS OF TEETH, UNSPECIFIED CAUSE, UNSPECIFIED CLASS: Chronic | ICD-10-CM

## 2025-01-12 PROCEDURE — 56420 I&D BARTHOLINS GLAND ABSCESS: CPT

## 2025-01-12 PROCEDURE — 99283 EMERGENCY DEPT VISIT LOW MDM: CPT | Mod: 25

## 2025-01-12 RX ORDER — DOXYCYCLINE HYCLATE 100 MG
1 TABLET ORAL
Qty: 14 | Refills: 0
Start: 2025-01-12 | End: 2025-01-18

## 2025-02-04 ENCOUNTER — RX RENEWAL (OUTPATIENT)
Age: 30
End: 2025-02-04

## 2025-06-05 ENCOUNTER — RX RENEWAL (OUTPATIENT)
Age: 30
End: 2025-06-05

## 2025-06-30 ENCOUNTER — APPOINTMENT (OUTPATIENT)
Dept: ENDOCRINOLOGY | Facility: CLINIC | Age: 30
End: 2025-06-30
